# Patient Record
Sex: MALE | Race: WHITE | Employment: UNEMPLOYED
[De-identification: names, ages, dates, MRNs, and addresses within clinical notes are randomized per-mention and may not be internally consistent; named-entity substitution may affect disease eponyms.]

---

## 2017-08-05 ENCOUNTER — HEALTH MAINTENANCE LETTER (OUTPATIENT)
Age: 22
End: 2017-08-05

## 2019-05-11 ENCOUNTER — HOSPITAL ENCOUNTER (EMERGENCY)
Facility: CLINIC | Age: 24
Discharge: HOME | End: 2019-05-11
Attending: EMERGENCY MEDICINE | Admitting: EMERGENCY MEDICINE

## 2019-05-11 VITALS
TEMPERATURE: 98.1 F | DIASTOLIC BLOOD PRESSURE: 92 MMHG | RESPIRATION RATE: 18 BRPM | SYSTOLIC BLOOD PRESSURE: 140 MMHG | OXYGEN SATURATION: 99 % | HEART RATE: 102 BPM

## 2019-05-11 DIAGNOSIS — F19.10 SUBSTANCE ABUSE (H): ICD-10-CM

## 2019-05-11 DIAGNOSIS — F19.951 DRUG INDUCED HALLUCINATIONS (H): ICD-10-CM

## 2019-05-11 LAB
AMPHETAMINES UR QL SCN: POSITIVE
BARBITURATES UR QL: NEGATIVE
BENZODIAZ UR QL: NEGATIVE
CANNABINOIDS UR QL SCN: NEGATIVE
COCAINE UR QL: NEGATIVE
OPIATES UR QL SCN: NEGATIVE
PCP UR QL SCN: NEGATIVE

## 2019-05-11 PROCEDURE — 90791 PSYCH DIAGNOSTIC EVALUATION: CPT

## 2019-05-11 PROCEDURE — 99285 EMERGENCY DEPT VISIT HI MDM: CPT | Mod: 25

## 2019-05-11 PROCEDURE — 80307 DRUG TEST PRSMV CHEM ANLYZR: CPT | Performed by: EMERGENCY MEDICINE

## 2019-05-11 ASSESSMENT — ENCOUNTER SYMPTOMS
COUGH: 0
VOMITING: 0
FEVER: 0
HALLUCINATIONS: 1

## 2019-05-11 NOTE — ED AVS SNAPSHOT
Emergency Department  [address] [place] [address]  Phone:  [phone]  Fax:  [phone]                                    Michele Cortez   MRN: [id number]    Department:   Emergency Department   Date of Visit:  5/11/2019           After Visit Summary Signature Page    I have received my discharge instructions, and my questions have been answered. I have discussed any challenges I see with this plan with the nurse or doctor.    ..........................................................................................................................................  Patient/Patient Representative Signature      ..........................................................................................................................................  Patient Representative Print Name and Relationship to Patient    ..................................................               ................................................  Date                                   Time    ..........................................................................................................................................  Reviewed by Signature/Title    ...................................................              ..............................................  Date                                               Time          22EPIC Rev 08/18

## 2019-05-11 NOTE — ED NOTES
"Encouraged patient to wake up x 4 in the past hour. Each time he agrees, says \"ok\" and then lays back down as soon as I leave the room.   "

## 2019-05-11 NOTE — ED PROVIDER NOTES
History     Chief Complaint:  Hallucinations (Pt doing speed and meth today in hotel room. Having auditory and visual hallucinations. Seen by police 4 times tonight. Last told him someone was breaking into his hotel room. Sober for 2 years. ^ months ago relapse.)    KIARA Cortez is a 23 year old male with a history of drug induced psychosis, who presents to the ED for the evaluation of hallucinations. Per the police, the patient was brought in after they were called to his hotel four separate times and that the patient was doing speed and meth there. The police reports that the patient was experiencing visual hallucinations and that he told them someone was breaking into his hotel room. The patient states that he was using meth and marijuana last night with friends and that he started to experience visual hallucinations. The patient was unable to describe his hallucinations due to his altered mental status and sleepiness. The patient denies fever, cough, vomiting, suicidal idealization, and alcohol use.     Allergies:  No known drug allergies     Medications:    The patient is not currently taking any prescribed medications.    Past Medical History:    Substance abuse  Wheezing    Past Surgical History:    History reviewed. No pertinent surgical history.     Family History:    History reviewed. No pertinent family history.      Social History:  Smoking status: Current Every Day Smoker  Alcohol use: Yes  Drug use: Methamphetamines   Marital Status:  Single [1]     Review of Systems   Constitutional: Negative for fever.   Respiratory: Negative for cough.    Gastrointestinal: Negative for vomiting.   Psychiatric/Behavioral: Positive for hallucinations. Negative for suicidal ideas.   All other systems reviewed and are negative.        Physical Exam     Patient Vitals for the past 24 hrs:   BP Temp Temp src Heart Rate Resp SpO2   05/11/19 0530 -- 98.1  F (36.7  C) Oral -- -- --   05/11/19 0529 124/88 -- -- 103  18 98 %        Physical Exam    Physical Exam   Constitutional:  Patient is very drowsy laying in bed.  Lying in the fetal position.  Keeps his eyes shut.  But does open them when prompted.  HENT:   Mouth/Throat:   Oropharynx is clear and moist.   Eyes:    Conjunctivae normal and EOM are normal. Pupils are equal, round, and reactive to light.   Neck:    Normal range of motion.   Cardiovascular: Normal rate, regular rhythm and normal heart sounds.  Exam reveals no gallop and no friction rub.  No murmur heard.  Pulmonary/Chest:  Effort normal and breath sounds normal. Patient has no wheezes. Patient has no rales.     Musculoskeletal:  Normal range of motion. Patient exhibits no edema.   Neurological:   Patient is somnolent and oriented to person,Patient has normal strength. No cranial nerve deficit or sensory deficit. GCS 15  Skin:   Skin is warm and dry. No rash noted. No erythema. No evidence of trauma  Psychiatric:   Patient is not suicidal    Emergency Department Course   Laboratory:  Drug abuse screen urine: Amphetamine Qual Positive     Emergency Department Course:  Past medical records, nursing notes, and vitals reviewed.  0630: I performed an exam of the patient and obtained history, as documented above.    DEC evaluated the patient.    0940: I rechecked the patient.  Findings and plan explained to the Patient. Patient discharged home with instructions regarding supportive care, medications, and reasons to return. The importance of close follow-up was reviewed.      Patient has court ordered adult teen challenge this week.     Impression & Plan    Medical Decision Making:  Michele Cortez is a 23-year-old male who was brought in by police after repeat visits to his motel room where he was believed to be using drugs and having hallucinations and it was felt he could not care for himself.  On my initial evaluation the patient was somnolent but was able to open his eyes and talk to me.  He denies feeling suicidal.   He does admit using drugs which in the past have given him drug-induced psychosis.  I did have the patient sleep it off here.  He saw DEC in the morning.  At this time it was felt that he is not an imminent danger to hurt himself or others.  He is sober enough to go back to his residence.  He does have court ordered chemical dependency treatment this week which he is planning to attend.  At this point he is safe for discharge    Diagnosis:    ICD-10-CM    1. Substance abuse (H) F19.10 Drug abuse screen urine   2. Drug induced hallucinations (H) F19.951        Disposition:  discharged to home    Scribe Disclosure:  José Antonio MAYERS, am serving as a scribe at 6:31 AM on 5/11/2019 to document services personally performed by Edie Arenas MD based on my observations and the provider's statements to me.      José Antonio Hollins  5/11/2019    EMERGENCY DEPARTMENT       Edie Arenas MD  05/11/19 1014

## 2020-02-10 ENCOUNTER — OFFICE VISIT (OUTPATIENT)
Dept: FAMILY MEDICINE | Facility: CLINIC | Age: 25
End: 2020-02-10
Payer: COMMERCIAL

## 2020-02-10 VITALS
DIASTOLIC BLOOD PRESSURE: 79 MMHG | BODY MASS INDEX: 25.87 KG/M2 | HEART RATE: 76 BPM | RESPIRATION RATE: 16 BRPM | TEMPERATURE: 97.6 F | WEIGHT: 191 LBS | SYSTOLIC BLOOD PRESSURE: 127 MMHG | HEIGHT: 72 IN | OXYGEN SATURATION: 98 %

## 2020-02-10 DIAGNOSIS — Z87.898 HISTORY OF INTRAVENOUS DRUG USE IN REMISSION: ICD-10-CM

## 2020-02-10 DIAGNOSIS — Z00.00 ROUTINE HISTORY AND PHYSICAL EXAMINATION OF ADULT: Primary | ICD-10-CM

## 2020-02-10 LAB
ANION GAP SERPL CALCULATED.3IONS-SCNC: 5 MMOL/L (ref 3–14)
BUN SERPL-MCNC: 12 MG/DL (ref 7–30)
CALCIUM SERPL-MCNC: 8.9 MG/DL (ref 8.5–10.1)
CHLORIDE SERPL-SCNC: 108 MMOL/L (ref 94–109)
CHOLEST SERPL-MCNC: 132 MG/DL
CO2 SERPL-SCNC: 27 MMOL/L (ref 20–32)
CREAT SERPL-MCNC: 0.76 MG/DL (ref 0.66–1.25)
GFR SERPL CREATININE-BSD FRML MDRD: >90 ML/MIN/{1.73_M2}
GLUCOSE SERPL-MCNC: 79 MG/DL (ref 70–99)
HDLC SERPL-MCNC: 42 MG/DL
LDLC SERPL CALC-MCNC: 74 MG/DL
NONHDLC SERPL-MCNC: 90 MG/DL
POTASSIUM SERPL-SCNC: 5.3 MMOL/L (ref 3.4–5.3)
SODIUM SERPL-SCNC: 139 MMOL/L (ref 133–144)
TRIGL SERPL-MCNC: 81 MG/DL

## 2020-02-10 RX ORDER — TRAZODONE HYDROCHLORIDE 50 MG/1
50 TABLET, FILM COATED ORAL AT BEDTIME
COMMUNITY

## 2020-02-10 ASSESSMENT — ANXIETY QUESTIONNAIRES
IF YOU CHECKED OFF ANY PROBLEMS ON THIS QUESTIONNAIRE, HOW DIFFICULT HAVE THESE PROBLEMS MADE IT FOR YOU TO DO YOUR WORK, TAKE CARE OF THINGS AT HOME, OR GET ALONG WITH OTHER PEOPLE: NOT DIFFICULT AT ALL
2. NOT BEING ABLE TO STOP OR CONTROL WORRYING: NOT AT ALL
6. BECOMING EASILY ANNOYED OR IRRITABLE: NOT AT ALL
3. WORRYING TOO MUCH ABOUT DIFFERENT THINGS: NOT AT ALL
1. FEELING NERVOUS, ANXIOUS, OR ON EDGE: NOT AT ALL
5. BEING SO RESTLESS THAT IT IS HARD TO SIT STILL: NOT AT ALL

## 2020-02-10 ASSESSMENT — MIFFLIN-ST. JEOR: SCORE: 1901.2

## 2020-02-10 ASSESSMENT — PATIENT HEALTH QUESTIONNAIRE - PHQ9
5. POOR APPETITE OR OVEREATING: NOT AT ALL
SUM OF ALL RESPONSES TO PHQ QUESTIONS 1-9: 2

## 2020-02-10 NOTE — PROGRESS NOTES
Michele Cortez is a 24 year old male who presents today for a physical exam for [facility].  Greg has no particular health concerns.  His DOC was meth amphetamines, his last use was one week ago, he has been at [facility] just under one week.      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  He eats poorly, has not had a dental exam for years, he does not exercise.  Of note, Greg's father  of a drug overdose.    Review Of Systems  Skin: negative  Eyes: negative  Ears/Nose/Throat: negative  Respiratory: No shortness of breath, dyspnea on exertion, cough, or hemoptysis  Cardiovascular: negative  Gastrointestinal: negative  Genitourinary: negative  Musculoskeletal: negative  Neurologic: negative  Psychiatric: negative  Hematologic/Lymphatic/Immunologic: negative  Endocrine: negative    Past Medical History:   Diagnosis Date     Substance abuse (H)      Wheezing     recurrent as infant treated with albuterol neb, not since infancy     History reviewed. No pertinent surgical history.  Social History     Socioeconomic History     Marital status: Single     Spouse name: Not on file     Number of children: Not on file     Years of education: Not on file     Highest education level: Not on file   Occupational History     Not on file   Social Needs     Financial resource strain: Not on file     Food insecurity:     Worry: Not on file     Inability: Not on file     Transportation needs:     Medical: Not on file     Non-medical: Not on file   Tobacco Use     Smoking status: Current Every Day Smoker     Smokeless tobacco: Never Used     Tobacco comment:  attempting to quit   Substance and Sexual Activity     Alcohol use: Not Currently     Drug use: Not Currently     Types: Methamphetamines, Amphetamines     Sexual activity: Yes     Partners: Female   Lifestyle     Physical activity:     Days per week: Not on file     Minutes per session: Not on file     Stress: Not on file   Relationships     Social  "connections:     Talks on phone: Not on file     Gets together: Not on file     Attends Moravian service: Not on file     Active member of club or organization: Not on file     Attends meetings of clubs or organizations: Not on file     Relationship status: Not on file     Intimate partner violence:     Fear of current or ex partner: Not on file     Emotionally abused: Not on file     Physically abused: Not on file     Forced sexual activity: Not on file   Other Topics Concern     Not on file   Social History Narrative     Not on file     Family History   Problem Relation Age of Onset     Substance Abuse Father      Family History Negative No family hx of        /79   Pulse 76   Temp 97.6  F (36.4  C) (Oral)   Resp 16   Ht 1.824 m (5' 11.8\")   Wt 86.6 kg (191 lb)   SpO2 98%   BMI 26.05 kg/m      Exam:  Constitutional: alert and no distress  Head: Normocephalic. No masses, lesions, tenderness or abnormalities  Neck: Neck supple. No adenopathy. Thyroid symmetric, normal size,, Carotids without bruits.  ENT: ENT exam normal, no neck nodes or sinus tenderness  Cardiovascular: negative, PMI normal. No lifts, heaves, or thrills. RRR. No murmurs, clicks gallops or rub  Respiratory: negative, Percussion normal. Good diaphragmatic excursion. Lungs clear  Gastrointestinal: Abdomen soft, non-tender. BS normal. No masses, organomegaly  : Normal external genitalia without lesions  Musculoskeletal: extremities normal- no gross deformities noted, gait normal and normal muscle tone  Skin: no suspicious lesions or rashes, well-healed tattoos  Neurologic: Gait normal. Reflexes normal and symmetric. Sensation grossly WNL.  Psychiatric: mentation appears normal and affect normal/bright  Hematologic/Lymphatic/Immunologic: Normal cervical lymph nodes    Assessment/Plan:  1. Routine history and physical examination of adult    - Lipid panel reflex to direct LDL Fasting  - Treponema Abs w Reflex to RPR and Titer  - Basic " metabolic panel    2. History of intravenous drug use in remission    - HIV Antigen Antibody Combo  - Hepatitis C antibody    Return to clinic in 2 weeks for follow up and report on lab results.      Options for treatment and follow-up care were reviewed with the patient. Patient engaged in the decision making process and verbalized understanding of the options discussed and agreed with the final plan.

## 2020-02-10 NOTE — NURSING NOTE
"24 year old  Chief Complaint   Patient presents with     Physical       Blood pressure 127/79, pulse 76, temperature 97.6  F (36.4  C), temperature source Oral, resp. rate 16, height 1.824 m (5' 11.8\"), weight 86.6 kg (191 lb), SpO2 98 %. Body mass index is 26.05 kg/m .  BP completed using cuff size:    There is no problem list on file for this patient.      Wt Readings from Last 2 Encounters:   02/10/20 86.6 kg (191 lb)   09/24/02 21.2 kg (46 lb 12 oz) (79 %)*     * Growth percentiles are based on [facility] (Boys, 2-20 Years) data.     BP Readings from Last 3 Encounters:   02/10/20 127/79   05/11/19 (!) 140/92   09/24/02 (!) 88/62 (21 %/ 75 %)*     *BP percentiles are based on the 2017 AAP Clinical Practice Guideline for boys       Allergies   Allergen Reactions     No Known Drug Allergies        Current Outpatient Medications   Medication     traZODone (DESYREL) 50 MG tablet     No current facility-administered medications for this visit.        Social History     Tobacco Use     Smoking status: Current Every Day Smoker     Smokeless tobacco: Never Used   Substance Use Topics     Alcohol use: Not Currently     Drug use: Yes     Types: Methamphetamines, Amphetamines       Honoring Choices - Health Care Directive Guide offered to patient at time of visit.    Health Maintenance Due   Topic Date Due     PREVENTIVE CARE VISIT  09/24/2003     DTAP/TDAP/TD IMMUNIZATION (6 - Tdap) 06/24/2008     HIV SCREENING  06/24/2012     PNEUMOCOCCAL IMMUNIZATION 19-64 MEDIUM RISK (1 of 1 - PPSV23) 06/24/2016     INFLUENZA VACCINE (1) 09/01/2019     PHQ-2  01/01/2020       Immunization History   Administered Date(s) Administered     DTAP (<7y) 1997, 1997, 05/28/1998, 11/04/1998, 09/24/2002     HepB 1997, 1997, 05/28/1998     Hib (PRP-T) 1997, 1997, 05/28/1998, 11/04/1998     MMR 11/04/1998, 09/24/2002     OPV, trivalent, live 1997, 1997, 05/28/1998     Poliovirus, inactivated (IPV) 09/24/2002 "     Varicella 11/04/1998       No results found for: PAP      No lab results found.    No flowsheet data found.    PHQ-9 SCORE 2/10/2020   PHQ-9 Total Score 2       No flowsheet data found.    No flowsheet data found.      Mabel Crouch CMA  February 10, 2020 7:53 AM

## 2020-02-11 LAB
HCV AB SERPL QL IA: NONREACTIVE
HIV 1+2 AB+HIV1 P24 AG SERPL QL IA: NONREACTIVE
T PALLIDUM AB SER QL: NONREACTIVE

## 2020-05-11 ENCOUNTER — OFFICE VISIT (OUTPATIENT)
Dept: FAMILY MEDICINE | Facility: CLINIC | Age: 25
End: 2020-05-11
Payer: COMMERCIAL

## 2020-05-11 VITALS
WEIGHT: 204.7 LBS | BODY MASS INDEX: 27.13 KG/M2 | DIASTOLIC BLOOD PRESSURE: 91 MMHG | TEMPERATURE: 97.8 F | HEART RATE: 94 BPM | HEIGHT: 73 IN | SYSTOLIC BLOOD PRESSURE: 144 MMHG | OXYGEN SATURATION: 98 %

## 2020-05-11 DIAGNOSIS — R07.0 THROAT PAIN: Primary | ICD-10-CM

## 2020-05-11 DIAGNOSIS — K13.0 LIP LESION: ICD-10-CM

## 2020-05-11 LAB — S PYO AG THROAT QL IA.RAPID: NEGATIVE

## 2020-05-11 ASSESSMENT — MIFFLIN-ST. JEOR: SCORE: 1979.21

## 2020-05-11 NOTE — PROGRESS NOTES
Michele Cortez is a 24 year old male who presents today because he has a tickle in his throat.  No fever, no cough.  He has been exposed to someone who was diagnosed with strep.  He has had symptoms for 2 days, he has not taken anything for this.    Greg also has a lesion on the mucosal surface of his left lower lip.  He smokes cigarettes, he has never chewed snuff.  This has been there for several months.    Review Of Systems   ROS: 10 point ROS neg other than the symptoms noted above in the HPI.    Past Medical History:   Diagnosis Date     Substance abuse (H)      Wheezing     recurrent as infant treated with albuterol neb, not since infancy     History reviewed. No pertinent surgical history.  Social History     Socioeconomic History     Marital status: Single     Spouse name: Not on file     Number of children: Not on file     Years of education: Not on file     Highest education level: Not on file   Occupational History     Not on file   Social Needs     Financial resource strain: Not on file     Food insecurity     Worry: Not on file     Inability: Not on file     Transportation needs     Medical: Not on file     Non-medical: Not on file   Tobacco Use     Smoking status: Current Every Day Smoker     Smokeless tobacco: Never Used     Tobacco comment:  attempting to quit   Substance and Sexual Activity     Alcohol use: Not Currently     Drug use: Not Currently     Types: Methamphetamines, Amphetamines     Sexual activity: Yes     Partners: Female   Lifestyle     Physical activity     Days per week: Not on file     Minutes per session: Not on file     Stress: Not on file   Relationships     Social connections     Talks on phone: Not on file     Gets together: Not on file     Attends Gnosticism service: Not on file     Active member of club or organization: Not on file     Attends meetings of clubs or organizations: Not on file     Relationship status: Not on file     Intimate partner violence     Fear of current  "or ex partner: Not on file     Emotionally abused: Not on file     Physically abused: Not on file     Forced sexual activity: Not on file   Other Topics Concern     Not on file   Social History Narrative     Not on file     Family History   Problem Relation Age of Onset     Substance Abuse Father      Family History Negative No family hx of        BP (!) 144/91   Pulse 94   Temp 97.8  F (36.6  C) (Oral)   Ht 1.849 m (6' 0.8\")   Wt 92.9 kg (204 lb 11.2 oz)   SpO2 98%   BMI 27.16 kg/m      Exam:  Constitutional: healthy, alert and no distress, masked  Head: Normocephalic. No masses, lesions, tenderness or abnormalities  Neck: Neck supple. No adenopathy. Thyroid symmetric, normal size,, Carotids without bruits.  ENT: ENT exam normal, no neck nodes or sinus tenderness, rapid throat culture negative for strep.  Mucosal surface inside of mouth, left lower lip, a firm nodular lesion that is approximately 1/2 cm diameter.  It looks black under the surface of the skin.  Psychiatric: mentation appears normal and affect normal/bright    Assessment/Plan:  1. Throat pain    - Rapid Strep Screen (Group) ([facility])  - Strep Culture (GABS)    2. Lip lesion    - DENTAL REFERRAL    Return to clinic if unable to get appointment.      Options for treatment and follow-up care were reviewed with the patient. Patient engaged in the decision making process and verbalized understanding of the options discussed and agreed with the final plan.  "

## 2020-05-11 NOTE — NURSING NOTE
"24 year old  Chief Complaint   Patient presents with     Throat Pain     Mass     Bump on gums, not painful, been there for a few months, growing in size       Blood pressure (!) 144/91, pulse 94, temperature 97.8  F (36.6  C), temperature source Oral, height 1.849 m (6' 0.8\"), weight 92.9 kg (204 lb 11.2 oz), SpO2 98 %. Body mass index is 27.16 kg/m .  BP completed using cuff size:    There is no problem list on file for this patient.      Wt Readings from Last 2 Encounters:   05/11/20 92.9 kg (204 lb 11.2 oz)   02/10/20 86.6 kg (191 lb)     BP Readings from Last 3 Encounters:   05/11/20 (!) 144/91   02/10/20 127/79   05/11/19 (!) 140/92       Allergies   Allergen Reactions     No Known Drug Allergies        Current Outpatient Medications   Medication     traZODone (DESYREL) 50 MG tablet     No current facility-administered medications for this visit.        Social History     Tobacco Use     Smoking status: Current Every Day Smoker     Smokeless tobacco: Never Used     Tobacco comment:  attempting to quit   Substance Use Topics     Alcohol use: Not Currently     Drug use: Not Currently     Types: Methamphetamines, Amphetamines       Honoring Choices - Health Care Directive Guide offered to patient at time of visit.    Health Maintenance Due   Topic Date Due     HPV IMMUNIZATION (1 - Male 2-dose series) 06/24/2008     DTAP/TDAP/TD IMMUNIZATION (6 - Tdap) 06/24/2008     PNEUMOCOCCAL IMMUNIZATION 19-64 MEDIUM RISK (1 of 1 - PPSV23) 06/24/2016       Immunization History   Administered Date(s) Administered     DTAP (<7y) 1997, 1997, 05/28/1998, 11/04/1998, 09/24/2002     HepB 1997, 1997, 05/28/1998     Hib (PRP-T) 1997, 1997, 05/28/1998, 11/04/1998     MMR 11/04/1998, 09/24/2002     OPV, trivalent, live 1997, 1997, 05/28/1998     Poliovirus, inactivated (IPV) 09/24/2002     Varicella 11/04/1998       No results found for: PAP      Recent Labs   Lab Test 02/10/20  0823 "   LDL 74   HDL 42   TRIG 81   CR 0.76   GFRESTIMATED >90   GFRESTBLACK >90   POTASSIUM 5.3       No flowsheet data found.    PHQ-9 SCORE 2/10/2020   PHQ-9 Total Score 2       No flowsheet data found.    No flowsheet data found.      Mabel Crouch, JARAD  May 11, 2020 2:05 PM

## 2020-05-13 LAB
BACTERIA SPEC CULT: NORMAL
Lab: NORMAL
SPECIMEN SOURCE: NORMAL

## 2020-10-08 ENCOUNTER — VIRTUAL VISIT (OUTPATIENT)
Dept: FAMILY MEDICINE | Facility: CLINIC | Age: 25
End: 2020-10-08
Payer: COMMERCIAL

## 2020-10-08 DIAGNOSIS — G47.00 PERSISTENT INSOMNIA: Primary | ICD-10-CM

## 2020-10-08 RX ORDER — MIRTAZAPINE 15 MG/1
15 TABLET, FILM COATED ORAL AT BEDTIME
Qty: 30 TABLET | Refills: 1 | Status: SHIPPED | OUTPATIENT
Start: 2020-10-08 | End: 2020-10-20

## 2020-10-08 NOTE — PROGRESS NOTES
"Michele Cortez is a 25 year old male who is being evaluated via a billable telephone visit.      The patient has been notified of following:     \"This telephone visit will be conducted via a call between you and your physician/provider. We have found that certain health care needs can be provided without the need for a physical exam.  This service lets us provide the care you need with a short phone conversation.  If a prescription is necessary we can send it directly to your pharmacy.  If lab work is needed we can place an order for that and you can then stop by our lab to have the test done at a later time.    Telephone visits are billed at different rates depending on your insurance coverage. During this emergency period, for some insurers they may be billed the same as an in-person visit.  Please reach out to your insurance provider with any questions.    If during the course of the call the physician/provider feels a telephone visit is not appropriate, you will not be charged for this service.\"    Patient has given verbal consent for Telephone visit?  Yes    What phone number would you like to be contacted at? [phone]    How would you like to obtain your AVS? Mail a copy    Subjective     Michele Cortez is a 25 year old male who presents via phone visit today for the following health issues:    Greg is having trouble falling asleep every night.  He has used trazodone with very transient relief.  He gets \"sleepy\" but does not want to increase the dose to initiate deeper sleep.  This is making it difficult for him to be productive during the day.  He has stopped taking naps, he does not drink caffeine after noon, he tries to work out most days.    Review of Systems   CONSTITUTIONAL: NEGATIVE for fever, chills, change in weight  ENT/MOUTH: NEGATIVE for ear, mouth and throat problems  RESP: NEGATIVE for significant cough or SOB  CV: NEGATIVE for chest pain, palpitations or peripheral edema       Objective    "       Vitals:  No vitals were obtained today due to virtual visit.    healthy, alert and no distress  PSYCH: Alert and oriented times 3; coherent speech, normal   rate and volume, able to articulate logical thoughts, able   to abstract reason, no tangential thoughts, no hallucinations   or delusions  His affect is normal  RESP: No cough, no audible wheezing, able to talk in full sentences  Remainder of exam unable to be completed due to telephone visits      Assessment/Plan:    1. Persistent insomnia    - mirtazapine (REMERON) 15 MG tablet; Take 1 tablet (15 mg) by mouth At Bedtime  Dispense: 30 tablet; Refill: 1    Follow up phone call visit in 2 weeks.      Phone call duration:  9 minutes

## 2020-10-20 DIAGNOSIS — G47.00 PERSISTENT INSOMNIA: ICD-10-CM

## 2020-10-20 RX ORDER — MIRTAZAPINE 15 MG/1
15 TABLET, FILM COATED ORAL AT BEDTIME
Qty: 30 TABLET | Refills: 1 | Status: SHIPPED | OUTPATIENT
Start: 2020-10-20 | End: 2020-12-22

## 2020-11-04 ENCOUNTER — VIRTUAL VISIT (OUTPATIENT)
Dept: FAMILY MEDICINE | Facility: CLINIC | Age: 25
End: 2020-11-04
Payer: COMMERCIAL

## 2020-11-04 DIAGNOSIS — Z72.0 TOBACCO ABUSE: Primary | ICD-10-CM

## 2020-11-04 RX ORDER — NICOTINE 21 MG/24HR
1 PATCH, TRANSDERMAL 24 HOURS TRANSDERMAL EVERY 24 HOURS
Qty: 28 PATCH | Refills: 1 | Status: SHIPPED | OUTPATIENT
Start: 2020-11-04 | End: 2021-05-26

## 2020-11-04 NOTE — PROGRESS NOTES
"Michele Cortez is a 25 year old male who is being evaluated via a billable telephone visit.      The patient has been notified of following:     \"This telephone visit will be conducted via a call between you and your physician/provider. We have found that certain health care needs can be provided without the need for a physical exam.  This service lets us provide the care you need with a short phone conversation.  If a prescription is necessary we can send it directly to your pharmacy.  If lab work is needed we can place an order for that and you can then stop by our lab to have the test done at a later time.    Telephone visits are billed at different rates depending on your insurance coverage. During this emergency period, for some insurers they may be billed the same as an in-person visit.  Please reach out to your insurance provider with any questions.    If during the course of the call the physician/provider feels a telephone visit is not appropriate, you will not be charged for this service.\"    Patient has given verbal consent for Telephone visit?  Yes    What phone number would you like to be contacted at? [phone]    How would you like to obtain your AVS? Mail a copy    Subjective     Michele Cortez is a 25 year old male who presents via phone visit today for the following health issues:  Greg is interested in smoking cessation and he has used a combination of the nicotine patch and lozenges to quit for 8 months.  He would like to try this again.  He is aware that he is not to smoke (at all) and use the nicotine replacement at the same time.  He is smoking about one pack per day of cigarettes now.    Review of Systems   CONSTITUTIONAL: NEGATIVE for fever, chills, change in weight  ENT/MOUTH: NEGATIVE for ear, mouth and throat problems  RESP: NEGATIVE for significant cough or SOB  CV: NEGATIVE for chest pain, palpitations or peripheral edema     Objective      Vitals:  No vitals were obtained today due " to virtual visit.    healthy, alert and no distress  PSYCH: Alert and oriented times 3; coherent speech, normal   rate and volume, able to articulate logical thoughts, able   to abstract reason, no tangential thoughts, no hallucinations   or delusions  His affect is normal  RESP: No cough, no audible wheezing, able to talk in full sentences  Remainder of exam unable to be completed due to telephone visits    Assessment/Plan:    1. Tobacco abuse    - nicotine (NICORETTE) 4 MG lozenge; Place 1 lozenge (4 mg) inside cheek as needed for smoking cessation  Dispense: 48 lozenge; Refill: 1  - nicotine (NICODERM CQ) 14 MG/24HR 24 hr patch; Place 1 patch onto the skin every 24 hours  Dispense: 28 patch; Refill: 1    I recommended the lower level patch as Greg plans to use 2-3 lozenges per day in addition to his patch, he said this worked best in the past.      Return for phone call in 2 weeks.      Phone call duration: 8 minutes

## 2020-12-20 DIAGNOSIS — G47.00 PERSISTENT INSOMNIA: ICD-10-CM

## 2020-12-20 DIAGNOSIS — G47.00 INSOMNIA, UNSPECIFIED TYPE: Primary | ICD-10-CM

## 2020-12-22 ENCOUNTER — TELEPHONE (OUTPATIENT)
Dept: FAMILY MEDICINE | Facility: CLINIC | Age: 25
End: 2020-12-22

## 2020-12-22 RX ORDER — MIRTAZAPINE 15 MG/1
TABLET, FILM COATED ORAL
Qty: 30 TABLET | Refills: 1 | Status: SHIPPED | OUTPATIENT
Start: 2020-12-22 | End: 2021-05-13 | Stop reason: SINTOL

## 2020-12-22 NOTE — TELEPHONE ENCOUNTER
Pt. Calls the clinic today needing a refill on his mirtazapine he states he is all out.       [facility] [place] [facility] - [place], [place] - [address] [place] [address] [address]    moise Espinosa

## 2020-12-22 NOTE — TELEPHONE ENCOUNTER
Patient called requesting refill of mirtazapine. Looks like request was sent to refill line, but provider did not receive request from them. Med refilled. Brenna SOLORZANO CNP

## 2021-05-12 NOTE — PROGRESS NOTES
"Patient: Michele Cortez YOB: 1995  Date of Exam: May/13/2021   Arrival Time: 07 34 AM  Departure Time: 08 30 AM    HPI:  Greg is a 25 year old male, [facility] resident, here for PE.  He is also requesting melatonin and nicotine replacement patches and lozenges.  Greg states he has pain under his right rib cage when he runs, this has been for a very long time.  He has had it evaluated in the past, he has been told it is \"nothing\" and yet he remains somewhat concerned about it.  He has no other particular health concerns today.      Greg was at [facility], graduated and left the program.  He relapsed for 3 months and is now returning.  His DOC are methamphetamine and heroin.  He has used IVD's.  This is his 4th or 5th time in treatment, he last used drugs 2 weeks ago, he has been at [facility] for 2 weeks.      Allergies:   Allergies   Allergen Reactions     No Known Drug Allergies        Patient Concerns:   Chief Complaint   Patient presents with     Physical     Smoking Cessation       Immunizations:   Most Recent Immunizations   Administered Date(s) Administered     COVID-19,PF,Pfizer 05/06/2021     DTAP (<7y) 09/24/2002     HepB 05/28/1998     Hib (PRP-T) 11/04/1998     MMR 09/24/2002     OPV, trivalent, live 05/28/1998     Poliovirus, inactivated (IPV) 09/24/2002     Tdap (Adacel,boostrix) 05/19/2015     Varicella 11/04/1998       Do you need any refills on your Medications today? No    Free of communicable diseases? Tested today    Health Maintenance:   HIV  Hep C    ROS  CONSTITUTIONAL: no fatigue, no unexpected change in weight, SKIN: no worrisome rashes, no worrisome moles, no worrisome lesions, EYES: no acute vision problems or changes, ENT: no ear problems, no mouth problems, no throat problems, RESP: no significant cough, no shortness of breath, CV: no chest pain, no palpitations, no new or worsening peripheral edema, GI: no nausea, no vomiting, no constipation, no diarrhea, : no frequency, no dysuria, no " "hematuria,  male :normal, no issues, NEURO: no weakness, no dizziness, no syncope, no headaches, HEME: no easy bruising, no bleeding problems and PSYCHIATRIC: NEGATIVE for changes in mood or affect      General Physical Exam:  Vitals: /83   Pulse 74   Temp 98.5  F (36.9  C) (Oral)   Ht 1.836 m (6' 0.3\")   Wt 81.3 kg (179 lb 3.2 oz)   SpO2 98%   BMI 24.10 kg/m    Past Medical History Reviewed? Yes.  Constitutional:   awake, alert, cooperative, no apparent distress, and appears stated age   Eyes:   Lids and lashes normal, pupils equal, round and reactive to light, extra ocular muscles intact, sclera clear, conjunctiva normal   ENT:   Normocephalic, without obvious abnormality, atraumatic, sinuses nontender on palpation, external ears without lesions, oral pharynx with moist mucous membranes, tonsils without erythema or exudates, gums normal and good dentition.   Neck:   Supple, symmetrical, trachea midline, no adenopathy, thyroid symmetric, not enlarged and no tenderness, skin normal   Hematologic / Lymphatic:   no cervical lymphadenopathy and no supraclavicular lymphadenopathy   Back:   Symmetric, no curvature, spinous processes are non-tender on palpation, paraspinous muscles are non-tender on palpation, no costal vertebral tenderness, poor posture   Lungs:   No increased work of breathing, good air exchange, clear to auscultation bilaterally, no crackles or wheezing   Cardiovascular:   Normal apical impulse, regular rate and rhythm, normal S1 and S2, no S3 or S4, and no murmur noted   Abdomen:   No scars, normal bowel sounds, soft, non-distended, non-tender, no masses palpated, no hepatosplenomegally   Chest / Breast:   Breasts symmetrical, skin without lesion(s), no nipple retraction or dimpling, no nipple discharge, no masses palpated, no axillary or supraclavicular adenopathy   Genitounirinary:   phallus meatus circumcised, right and left testis normal, right and left epididymis and vas deferens " "normal, scrotal skin normal and perineum normal   Musculoskeletal:   There is no redness, warmth, or swelling of the joints.  Full range of motion noted.  Motor strength is 5 out of 5 all extremities bilaterally.  Tone is normal.   Neurologic:   Awake, alert, oriented to name, place and time.  Cranial nerves II-XII are grossly intact.  Motor is 5 out of 5 bilaterally.  Cerebellar finger to nose, heel to shin intact.  Sensory is intact.  Babinski down going, Romberg negative, and gait is normal.   Neuropsychiatric:   General: normal, calm and normal eye contact  Level of consciousness: drowsy  Affect: normal  Orientation: oriented to self, place, time and situation  Memory and insight: normal, memory for past and recent events intact and thought process normal   Skin:   no bruising or bleeding, normal skin color, texture, turgor, no redness, warmth, or swelling, no rashes, no lesions, no abnormal moles, nails normal without discoloration or clubbing and no jaundice, multiple tattoos       Recommended Diet and Special Instructions: No  Limitations or restrictions for activities: No  Information Pertinent to treatment in case of emergency None    (F19.90) IV drug user  (primary encounter diagnosis)    Plan: Hepatitis C Screen Reflex to HCV RNA Quant and         Genotype, HIV Antigen Antibody Combo      (Z00.00) Routine history and physical examination of adult    Plan: CBC with platelets differential, Comprehensive         metabolic panel, Lipid panel reflex to direct         LDL Fasting, Quantiferon TB Gold Plus         (Z72.0) Tobacco abuse    Plan: nicotine (NICODERM CQ) 14 MG/24HR 24 hr patch,         nicotine (NICORETTE) 4 MG lozenge      (G47.00) Persistent insomnia    Plan: melatonin 5 MG tablet       (R10.9) Right-sided abdominal pain of unknown cause  Comment: this is most likely due to a \"stitch\" with activity, Greg will follow this    Medication Changes:   MEDICATIONS:   Orders Placed This Encounter "   Medications     melatonin 5 MG tablet     Sig: Take 1 tablet (5 mg) by mouth nightly as needed for sleep     Dispense:  30 tablet     Refill:  1     nicotine (NICODERM CQ) 14 MG/24HR 24 hr patch     Sig: Place 1 patch onto the skin every 24 hours     Dispense:  30 patch     Refill:  1     nicotine (NICORETTE) 4 MG lozenge     Sig: Place 1 lozenge (4 mg) inside cheek as needed for smoking cessation     Dispense:  48 lozenge     Refill:  1          - Continue other medications without change    Referrals   none    Follow up plan   Follow up as needed.      Elizabeth Linares NP

## 2021-05-13 ENCOUNTER — OFFICE VISIT (OUTPATIENT)
Dept: FAMILY MEDICINE | Facility: CLINIC | Age: 26
End: 2021-05-13
Payer: COMMERCIAL

## 2021-05-13 VITALS
OXYGEN SATURATION: 98 % | DIASTOLIC BLOOD PRESSURE: 83 MMHG | SYSTOLIC BLOOD PRESSURE: 124 MMHG | HEIGHT: 72 IN | BODY MASS INDEX: 24.27 KG/M2 | HEART RATE: 74 BPM | WEIGHT: 179.2 LBS | TEMPERATURE: 98.5 F

## 2021-05-13 DIAGNOSIS — Z00.00 ROUTINE HISTORY AND PHYSICAL EXAMINATION OF ADULT: ICD-10-CM

## 2021-05-13 DIAGNOSIS — R10.9 RIGHT-SIDED ABDOMINAL PAIN OF UNKNOWN CAUSE: ICD-10-CM

## 2021-05-13 DIAGNOSIS — F19.90 IV DRUG USER: Primary | ICD-10-CM

## 2021-05-13 DIAGNOSIS — G47.00 PERSISTENT INSOMNIA: ICD-10-CM

## 2021-05-13 DIAGNOSIS — Z72.0 TOBACCO ABUSE: ICD-10-CM

## 2021-05-13 LAB
ALBUMIN SERPL-MCNC: 4.4 G/DL (ref 3.4–5)
ALP SERPL-CCNC: 70 U/L (ref 40–150)
ALT SERPL W P-5'-P-CCNC: 32 U/L (ref 0–70)
ANION GAP SERPL CALCULATED.3IONS-SCNC: 7 MMOL/L (ref 3–14)
AST SERPL W P-5'-P-CCNC: 19 U/L (ref 0–45)
BASOPHILS # BLD AUTO: 0 10E9/L (ref 0–0.2)
BASOPHILS NFR BLD AUTO: 0.7 %
BILIRUB SERPL-MCNC: 0.5 MG/DL (ref 0.2–1.3)
BUN SERPL-MCNC: 14 MG/DL (ref 7–30)
CALCIUM SERPL-MCNC: 9.5 MG/DL (ref 8.5–10.1)
CHLORIDE SERPL-SCNC: 105 MMOL/L (ref 94–109)
CHOLEST SERPL-MCNC: 199 MG/DL
CO2 SERPL-SCNC: 27 MMOL/L (ref 20–32)
CREAT SERPL-MCNC: 0.78 MG/DL (ref 0.66–1.25)
DIFFERENTIAL METHOD BLD: NORMAL
EOSINOPHIL # BLD AUTO: 0.1 10E9/L (ref 0–0.7)
EOSINOPHIL NFR BLD AUTO: 1.9 %
ERYTHROCYTE [DISTWIDTH] IN BLOOD BY AUTOMATED COUNT: 12.9 % (ref 10–15)
GFR SERPL CREATININE-BSD FRML MDRD: >90 ML/MIN/{1.73_M2}
GLUCOSE SERPL-MCNC: 62 MG/DL (ref 70–99)
HCT VFR BLD AUTO: 46.5 % (ref 40–53)
HDLC SERPL-MCNC: 46 MG/DL
HGB BLD-MCNC: 15.4 G/DL (ref 13.3–17.7)
IMM GRANULOCYTES # BLD: 0 10E9/L (ref 0–0.4)
IMM GRANULOCYTES NFR BLD: 0.5 %
LDLC SERPL CALC-MCNC: 120 MG/DL
LYMPHOCYTES # BLD AUTO: 1.7 10E9/L (ref 0.8–5.3)
LYMPHOCYTES NFR BLD AUTO: 28.7 %
MCH RBC QN AUTO: 28.2 PG (ref 26.5–33)
MCHC RBC AUTO-ENTMCNC: 33.1 G/DL (ref 31.5–36.5)
MCV RBC AUTO: 85 FL (ref 78–100)
MONOCYTES # BLD AUTO: 0.6 10E9/L (ref 0–1.3)
MONOCYTES NFR BLD AUTO: 9.5 %
NEUTROPHILS # BLD AUTO: 3.4 10E9/L (ref 1.6–8.3)
NEUTROPHILS NFR BLD AUTO: 58.7 %
NONHDLC SERPL-MCNC: 153 MG/DL
NRBC # BLD AUTO: 0 10*3/UL
NRBC BLD AUTO-RTO: 0 /100
PLATELET # BLD AUTO: 206 10E9/L (ref 150–450)
POTASSIUM SERPL-SCNC: 4.3 MMOL/L (ref 3.4–5.3)
PROT SERPL-MCNC: 7.7 G/DL (ref 6.8–8.8)
RBC # BLD AUTO: 5.46 10E12/L (ref 4.4–5.9)
SODIUM SERPL-SCNC: 139 MMOL/L (ref 133–144)
TRIGL SERPL-MCNC: 167 MG/DL
WBC # BLD AUTO: 5.8 10E9/L (ref 4–11)

## 2021-05-13 RX ORDER — NICOTINE 21 MG/24HR
1 PATCH, TRANSDERMAL 24 HOURS TRANSDERMAL EVERY 24 HOURS
Qty: 30 PATCH | Refills: 1 | Status: SHIPPED | OUTPATIENT
Start: 2021-05-13 | End: 2021-05-26

## 2021-05-13 ASSESSMENT — PATIENT HEALTH QUESTIONNAIRE - PHQ9
SUM OF ALL RESPONSES TO PHQ QUESTIONS 1-9: 2
5. POOR APPETITE OR OVEREATING: SEVERAL DAYS

## 2021-05-13 ASSESSMENT — ANXIETY QUESTIONNAIRES
6. BECOMING EASILY ANNOYED OR IRRITABLE: SEVERAL DAYS
1. FEELING NERVOUS, ANXIOUS, OR ON EDGE: SEVERAL DAYS
7. FEELING AFRAID AS IF SOMETHING AWFUL MIGHT HAPPEN: NOT AT ALL
3. WORRYING TOO MUCH ABOUT DIFFERENT THINGS: NOT AT ALL
5. BEING SO RESTLESS THAT IT IS HARD TO SIT STILL: NOT AT ALL
2. NOT BEING ABLE TO STOP OR CONTROL WORRYING: NOT AT ALL
GAD7 TOTAL SCORE: 3
IF YOU CHECKED OFF ANY PROBLEMS ON THIS QUESTIONNAIRE, HOW DIFFICULT HAVE THESE PROBLEMS MADE IT FOR YOU TO DO YOUR WORK, TAKE CARE OF THINGS AT HOME, OR GET ALONG WITH OTHER PEOPLE: NOT DIFFICULT AT ALL

## 2021-05-13 ASSESSMENT — MIFFLIN-ST. JEOR: SCORE: 1850.61

## 2021-05-13 NOTE — NURSING NOTE
"ROOM:1    25 year old  Chief Complaint   Patient presents with     Physical     Smoking Cessation       Blood pressure 124/83, pulse 74, temperature 98.5  F (36.9  C), temperature source Oral, height 1.836 m (6' 0.3\"), weight 81.3 kg (179 lb 3.2 oz), SpO2 98 %. Body mass index is 24.1 kg/m .      There is no problem list on file for this patient.      Wt Readings from Last 2 Encounters:   05/13/21 81.3 kg (179 lb 3.2 oz)   05/11/20 92.9 kg (204 lb 11.2 oz)     BP Readings from Last 3 Encounters:   05/13/21 124/83   05/11/20 (!) 144/91   02/10/20 127/79       Allergies   Allergen Reactions     No Known Drug Allergies        Current Outpatient Medications   Medication     mirtazapine (REMERON) 15 MG tablet     nicotine (NICODERM CQ) 14 MG/24HR 24 hr patch     nicotine (NICORETTE) 4 MG lozenge     traZODone (DESYREL) 50 MG tablet     No current facility-administered medications for this visit.        Social History     Tobacco Use     Smoking status: Current Every Day Smoker     Smokeless tobacco: Never Used     Tobacco comment:  attempting to quit   Substance Use Topics     Alcohol use: Not Currently     Drug use: Not Currently     Types: Methamphetamines, Amphetamines       Honoring Choices - Health Care Directive Guide offered to patient at time of visit.    Health Maintenance Due   Topic Date Due     ADVANCE CARE PLANNING  Never done     Pneumococcal Vaccine: Pediatrics (0 to 5 Years) and At-Risk Patients (6 to 64 Years) (1 of 2 - PPSV23) Never done     HPV IMMUNIZATION (1 - Male 2-dose series) Never done     PREVENTIVE CARE VISIT  02/10/2021       Immunization History   Administered Date(s) Administered     COVID-19,PF,Pfizer 05/06/2021     DTAP (<7y) 1995, 1995, 05/28/1998, 11/04/1998, 09/24/2002     HepB [birth year], 05/28/1998     Hib (PRP-T) 1995, 1995, 05/28/1998, 11/04/1998     MMR 11/04/1998, 09/24/2002     OPV, trivalent, live 1995, 1995, 05/28/1998     " Poliovirus, inactivated (IPV) 09/24/2002     Tdap (Adacel,boostrix) 05/19/2015     Varicella 11/04/1998       No results found for: PAP      Recent Labs   Lab Test 02/10/20  0823   LDL 74   HDL 42   TRIG 81   CR 0.76   GFRESTIMATED >90   GFRESTBLACK >90   POTASSIUM 5.3       PHQ-2 ( 1999 Pfizer) 11/4/2020 10/8/2020   Q1: Little interest or pleasure in doing things 0 0   Q2: Feeling down, depressed or hopeless 0 0   PHQ-2 Score 0 0       PHQ-9 SCORE 2/10/2020 5/13/2021   PHQ-9 Total Score 2 2       BRIAN-7 SCORE 5/13/2021   Total Score 3       No flowsheet data found.      Mabel Crouch, [facility]  May 13, 2021 7:50 AM

## 2021-05-14 LAB
HCV AB SERPL QL IA: NONREACTIVE
HIV 1+2 AB+HIV1 P24 AG SERPL QL IA: NONREACTIVE

## 2021-05-14 ASSESSMENT — ANXIETY QUESTIONNAIRES: GAD7 TOTAL SCORE: 3

## 2021-05-15 LAB
GAMMA INTERFERON BACKGROUND BLD IA-ACNC: 0 IU/ML
M TB IFN-G CD4+ BCKGRND COR BLD-ACNC: 10 IU/ML
M TB TUBERC IFN-G BLD QL: NEGATIVE
MITOGEN IGNF BCKGRD COR BLD-ACNC: 0 IU/ML
MITOGEN IGNF BCKGRD COR BLD-ACNC: 0 IU/ML

## 2021-05-26 ENCOUNTER — OFFICE VISIT (OUTPATIENT)
Dept: FAMILY MEDICINE | Facility: CLINIC | Age: 26
End: 2021-05-26
Payer: COMMERCIAL

## 2021-05-26 VITALS
SYSTOLIC BLOOD PRESSURE: 124 MMHG | HEART RATE: 81 BPM | OXYGEN SATURATION: 99 % | HEIGHT: 72 IN | TEMPERATURE: 97.9 F | DIASTOLIC BLOOD PRESSURE: 79 MMHG | WEIGHT: 182.8 LBS | BODY MASS INDEX: 24.76 KG/M2

## 2021-05-26 DIAGNOSIS — Z71.3 NUTRITIONAL COUNSELING: ICD-10-CM

## 2021-05-26 DIAGNOSIS — E78.5 MILD HYPERLIPIDEMIA: Primary | ICD-10-CM

## 2021-05-26 DIAGNOSIS — L98.9 SKIN LESION: ICD-10-CM

## 2021-05-26 ASSESSMENT — MIFFLIN-ST. JEOR: SCORE: 1860.59

## 2021-05-26 NOTE — PROGRESS NOTES
"Patient: Michele Cortez YOB: 1995    Date of Exam: May/26/2021    Please be advised that this client resides in a facility in which narcotic medications are not permitted. If pain management is needed, please prescribe an alternative medication.     Michele Cortez is a 25 year old who presents for the following:  To follow up on lab tests, a mole on his posterior left lower leg and on-going concerns about his blood pressure.    1.)  Greg's cholesterol level is just slightly elevated, he is aware that dietary measures might be appropriate.  He is interested in that approach at this time.   2.)  There is a skin lesion, most likely a dermatofibroma on his posterior left lower leg.  He has used a \"cold\" preparation on it that he ordered on [employer]- he said this was not completely successful.  He is not aware how long he has had it, it just \"showed up one day.\"  His main reason for having it gone is that he wants to do a tattoo on that leg, like the one on his other leg, and he thinks the lesion will interfere.  He also has a very small, new lesion that is raised and red on the front of his left lower leg.  He would just like me to take a look at it.  He has a scar on his right left knee where a parent used the OTC prep on a lesion and it left this scar.    3.)  Greg is worried about his blood pressure.  He is wondering if there are dietary measures related to keeping his BP in a normal range.      Patient presents with:  Results  Mole: Left leg  Hypertension: concerns about BP    Do you need any refills on your Medications today? No    Review Of Systems  CONSTITUTIONAL: no fatigue, no unexpected change in weight  SKIN: no worrisome rashes, no worrisome moles, no worrisome lesions  EYES: no acute vision problems or changes  ENT: no ear problems, no mouth problems, no throat problems  RESP: no significant cough, no shortness of breath  CV: no chest pain, no palpitations, no new or worsening peripheral " "edema  GI: no nausea, no vomiting, no constipation, no diarrhea    General Physical Exam:  Vitals: /79   Pulse 81   Temp 97.9  F (36.6  C) (Oral)   Ht 1.826 m (5' 11.9\")   Wt 82.9 kg (182 lb 12.8 oz)   SpO2 99%   BMI 24.86 kg/m    Lungs:   No increased work of breathing, good air exchange, clear to auscultation bilaterally, no crackles or wheezing   , Cardiovascular:   Normal apical impulse, regular rate and rhythm, normal S1 and S2, no S3 or S4, and no murmur noted    and Skin:   no bruising or bleeding, normal skin color, texture, turgor, no redness, warmth, or swelling, no rashes, nails normal without discoloration or clubbing and no jaundice  Posterior left lower leg, almost nodular firm wart/dermatofibroma lesion noted, ~ 2-3 mm diam.  Dry/crusty surface with pinpoint vascular appearance.  1 mm diam raised red lesion anterior left leg.         Additional Comments:N/A    Assessment / Plan:  1. Mild hyperlipidemia  Discussed the need for watching the fats in the diet, keeping weight managed and exercise    2. Skin lesion  I informed Greg that treatment for what appears to be a very benign skin lesion may not be covered by insurance.  He will keep an eye on it and will RTC if needed.    3. Nutritional counseling  I will ask [facility] health support to provide nutrition information related to fat and sodium foods.      I reassured Greg that his BP is very good and that he is doing a good job to take care of his own health needs.      Follow up as needed.    Medication changed made at today's visit: MEDICATIONS:        - Continue other medications without change    Elizabeth Linares NP May 26, 2021     Arrival Time: 1000 AM  Departure Time: 11 00 AM         "

## 2021-05-26 NOTE — NURSING NOTE
"ROOM:1    25 year old  Chief Complaint   Patient presents with     Results     Mole     Left leg     Hypertension     concerns about BP       Blood pressure 124/79, pulse 81, temperature 97.9  F (36.6  C), temperature source Oral, height 1.826 m (5' 11.9\"), weight 82.9 kg (182 lb 12.8 oz), SpO2 99 %. Body mass index is 24.86 kg/m .      There is no problem list on file for this patient.      Wt Readings from Last 2 Encounters:   05/26/21 82.9 kg (182 lb 12.8 oz)   05/13/21 81.3 kg (179 lb 3.2 oz)     BP Readings from Last 3 Encounters:   05/26/21 124/79   05/13/21 124/83   05/11/20 (!) 144/91       Allergies   Allergen Reactions     No Known Drug Allergies        Current Outpatient Medications   Medication     nicotine (NICORETTE) 4 MG lozenge     melatonin 5 MG tablet     nicotine (NICODERM CQ) 14 MG/24HR 24 hr patch     nicotine (NICODERM CQ) 14 MG/24HR 24 hr patch     nicotine (NICORETTE) 4 MG lozenge     traZODone (DESYREL) 50 MG tablet     No current facility-administered medications for this visit.        Social History     Tobacco Use     Smoking status: Current Every Day Smoker     Smokeless tobacco: Never Used     Tobacco comment:  attempting to quit   Substance Use Topics     Alcohol use: Not Currently     Drug use: Not Currently     Types: Methamphetamines, Amphetamines, Opiates       Honoring Choices - Health Care Directive Guide offered to patient at time of visit.    Health Maintenance Due   Topic Date Due     ADVANCE CARE PLANNING  Never done     Pneumococcal Vaccine: Pediatrics (0 to 5 Years) and At-Risk Patients (6 to 64 Years) (1 of 2 - PPSV23) Never done     HPV IMMUNIZATION (1 - Male 2-dose series) Never done     COVID-19 Vaccine (2 - Pfizer 2-dose series) 05/27/2021       Immunization History   Administered Date(s) Administered     COVID-19,PF,Pfizer 05/06/2021     DTAP (<7y) 1995, 1995, 05/28/1998, 11/04/1998, 09/24/2002     HepB 1995, 1997, 05/28/1998     Hib (PRP-T) " 1997, 1997, 05/28/1998, 11/04/1998     MMR 11/04/1998, 09/24/2002     OPV, trivalent, live 1997, 1997, 05/28/1998     Poliovirus, inactivated (IPV) 09/24/2002     Tdap (Adacel,boostrix) 05/19/2015     Varicella 11/04/1998       No results found for: PAP      Recent Labs   Lab Test 05/13/21  0815 02/10/20  0823   * 74   HDL 46 42   TRIG 167* 81   ALT 32  --    CR 0.78 0.76   GFRESTIMATED >90 >90   GFRESTBLACK >90 >90   ALBUMIN 4.4  --    POTASSIUM 4.3 5.3       PHQ-2 ( 1999 Pfizer) 5/26/2021 11/4/2020   Q1: Little interest or pleasure in doing things 0 0   Q2: Feeling down, depressed or hopeless 0 0   PHQ-2 Score 0 0       PHQ-9 SCORE 2/10/2020 5/13/2021   PHQ-9 Total Score 2 2       BRIAN-7 SCORE 5/13/2021   Total Score 3       No flowsheet data found.      Mabel Crouch, [facility]  May 26, 2021 10:09 AM

## 2021-06-24 DIAGNOSIS — Z13.9 SCREENING FOR CONDITION: Primary | ICD-10-CM

## 2021-06-27 LAB
GAMMA INTERFERON BACKGROUND BLD IA-ACNC: 0.02 IU/ML
M TB IFN-G CD4+ BCKGRND COR BLD-ACNC: 9.98 IU/ML
M TB TUBERC IFN-G BLD QL: NEGATIVE
MITOGEN IGNF BCKGRD COR BLD-ACNC: 0 IU/ML
MITOGEN IGNF BCKGRD COR BLD-ACNC: 0 IU/ML